# Patient Record
Sex: MALE | Race: WHITE | HISPANIC OR LATINO | URBAN - METROPOLITAN AREA
[De-identification: names, ages, dates, MRNs, and addresses within clinical notes are randomized per-mention and may not be internally consistent; named-entity substitution may affect disease eponyms.]

---

## 2024-11-15 ENCOUNTER — TELEPHONE (OUTPATIENT)
Dept: UROLOGY | Facility: CLINIC | Age: 34
End: 2024-11-15
Payer: COMMERCIAL

## 2024-11-15 NOTE — TELEPHONE ENCOUNTER
Spoke with pt and informed him that Dr. Schilling was no longer here at [facility]. Pt stated that he has been having some irration and pressure after urinating. Pt went to urgent care about 3 weeks and was given cipro abx and was told to follow up with urologist. Pt was scheduled with NP. Pt confirmed appt

## 2024-11-15 NOTE — TELEPHONE ENCOUNTER
----- Message from Patricia sent at 11/15/2024  4:28 PM CST -----  Type:  Needs Medical Advice/new patient     Who Called: pt    Would the patient rather a call back or a response via [facility]? Call     Best Call Back Number: [phone]    Additional Information: new patient appointment.

## 2024-11-18 ENCOUNTER — TELEPHONE (OUTPATIENT)
Dept: UROLOGY | Facility: CLINIC | Age: 34
End: 2024-11-18

## 2024-11-18 ENCOUNTER — LAB VISIT (OUTPATIENT)
Dept: LAB | Facility: HOSPITAL | Age: 34
End: 2024-11-18
Payer: COMMERCIAL

## 2024-11-18 ENCOUNTER — OFFICE VISIT (OUTPATIENT)
Dept: UROLOGY | Facility: CLINIC | Age: 34
End: 2024-11-18
Payer: COMMERCIAL

## 2024-11-18 VITALS
WEIGHT: 156.5 LBS | BODY MASS INDEX: 25.15 KG/M2 | SYSTOLIC BLOOD PRESSURE: 129 MMHG | HEIGHT: 66 IN | HEART RATE: 90 BPM | DIASTOLIC BLOOD PRESSURE: 91 MMHG

## 2024-11-18 DIAGNOSIS — R35.0 URINARY FREQUENCY: ICD-10-CM

## 2024-11-18 DIAGNOSIS — R10.2 SUPRAPUBIC PAIN: ICD-10-CM

## 2024-11-18 DIAGNOSIS — R39.15 URINARY URGENCY: ICD-10-CM

## 2024-11-18 DIAGNOSIS — R39.15 URINARY URGENCY: Primary | ICD-10-CM

## 2024-11-18 DIAGNOSIS — N53.12 PAIN WITH EJACULATION: ICD-10-CM

## 2024-11-18 DIAGNOSIS — R39.13 SPLIT OF URINARY STREAM: ICD-10-CM

## 2024-11-18 LAB
BILIRUB SERPL-MCNC: ABNORMAL MG/DL
BLOOD URINE, POC: ABNORMAL
CLARITY, POC UA: CLEAR
COLOR, POC UA: YELLOW
GLUCOSE UR QL STRIP: ABNORMAL
KETONES UR QL STRIP: ABNORMAL
LEUKOCYTE ESTERASE URINE, POC: ABNORMAL
NITRITE, POC UA: ABNORMAL
PH, POC UA: 6
POC RESIDUAL URINE VOLUME: 1 ML (ref 0–100)
PROTEIN, POC: ABNORMAL
SPECIFIC GRAVITY, POC UA: 1.01
UROBILINOGEN, POC UA: ABNORMAL

## 2024-11-18 PROCEDURE — 3008F BODY MASS INDEX DOCD: CPT | Mod: CPTII,S$GLB,,

## 2024-11-18 PROCEDURE — 87186 SC STD MICRODIL/AGAR DIL: CPT

## 2024-11-18 PROCEDURE — 87086 URINE CULTURE/COLONY COUNT: CPT

## 2024-11-18 PROCEDURE — 81002 URINALYSIS NONAUTO W/O SCOPE: CPT | Mod: S$GLB,,,

## 2024-11-18 PROCEDURE — 99204 OFFICE O/P NEW MOD 45 MIN: CPT | Mod: S$GLB,,,

## 2024-11-18 PROCEDURE — 3080F DIAST BP >= 90 MM HG: CPT | Mod: CPTII,S$GLB,,

## 2024-11-18 PROCEDURE — 1159F MED LIST DOCD IN RCRD: CPT | Mod: CPTII,S$GLB,,

## 2024-11-18 PROCEDURE — 1160F RVW MEDS BY RX/DR IN RCRD: CPT | Mod: CPTII,S$GLB,,

## 2024-11-18 PROCEDURE — 87798 DETECT AGENT NOS DNA AMP: CPT

## 2024-11-18 PROCEDURE — 99999 PR PBB SHADOW E&M-EST. PATIENT-LVL III: CPT | Mod: PBBFAC,,,

## 2024-11-18 PROCEDURE — 51798 US URINE CAPACITY MEASURE: CPT | Mod: S$GLB,,,

## 2024-11-18 PROCEDURE — 87563 M. GENITALIUM AMP PROBE: CPT

## 2024-11-18 PROCEDURE — 87088 URINE BACTERIA CULTURE: CPT

## 2024-11-18 PROCEDURE — 3074F SYST BP LT 130 MM HG: CPT | Mod: CPTII,S$GLB,,

## 2024-11-18 RX ORDER — DOXYCYCLINE 100 MG/1
100 CAPSULE ORAL 2 TIMES DAILY
Qty: 14 CAPSULE | Refills: 0 | Status: SHIPPED | OUTPATIENT
Start: 2024-11-18 | End: 2024-11-25

## 2024-11-18 NOTE — PROGRESS NOTES
"Subjective:       Patient ID: Gokul Ryan is a 34 y.o. male.    Chief Complaint: urinary issues     This is a 34 y.o.  male patient that is new to me.  The patient was referred to me by Dr. Henriquez for urinary issues. Patient went to urgent care on 10/17/24 w/ c/o urinary frequency, urgency, and dysuria. Patient was given 2 weeks of ciprofloxacin. STD screening with urgent care negative per patient.   Today patient reports the ciprofloxacin helped with symptoms but symptoms come back intermittently. Reports urinary urgency, frequency, suprapubic discomfort, burning in urethra but no burning while voiding. Reports pinching pain during ejaculation.  Reports split steam for years. Sometimes has weak stream. Denies straining to urinate. Reports that he has been refraining from caffeine and increasing water intake. Reports that he did take ibuprofen a couple of times and this did help.  PVR 1ml immediately after urinating in clinic  Urine dipstick- positive for Leukocytes, negative for RBC, and Nitrites     LAST PSA  No results found for: "PSA", "PSADIAG", "PSATOTAL", "PSAFREE"    No results found for: "CREATININE"    ---  PMH/PSH/Medications/Allergies/Social history reviewed and as in chart.    Review of Systems   Constitutional:  Negative for activity change, chills and fever.   Respiratory:  Negative for shortness of breath.    Cardiovascular:  Negative for chest pain and palpitations.   Gastrointestinal:  Negative for abdominal pain and constipation.   Genitourinary:  Positive for difficulty urinating, frequency and urgency. Negative for dysuria, flank pain and hematuria.   Neurological:  Negative for dizziness and light-headedness.       Objective:      Physical Exam  HENT:      Head: Normocephalic.   Pulmonary:      Effort: Pulmonary effort is normal.   Genitourinary:     Prostate: Not enlarged, not tender and no nodules present.      Comments: ENRIQUE-- prostate firm, smooth without nodules or lesions, not " enlarged  Musculoskeletal:         General: Normal range of motion.      Cervical back: Normal range of motion.   Skin:     General: Skin is warm and dry.   Neurological:      Mental Status: He is alert and oriented to person, place, and time.         Assessment:     Problem Noted   Urinary Frequency 11/18/2024   Urinary Urgency 11/18/2024   Suprapubic Pain 11/18/2024   Pain With Ejaculation 11/18/2024   Split of Urinary Stream 11/18/2024       Plan:     Rx sent for doxycycline BID x7 days.  Advised to drink 2L of water per day, refrain from bladder irritants, take ibuprofen 400mg TID x2 weeks.  Discussed future cystoscopy if split stream continues, patient voiced understanding  ENRIQUE normal, see physical assessment  Urine sent for urine culture, ureaplasma, and mycoplasma  Follow-up 2 weeks with virtual visit    JERRY Goode    I spent a total of 30 minutes on the day of the visit.This includes face to face time and non-face to face time preparing to see the patient (eg, review of tests), obtaining and/or reviewing separately obtained history, documenting clinical information in the electronic or other health record, independently interpreting results and communicating results to the patient/family/caregiver, or care coordinator.

## 2024-11-18 NOTE — TELEPHONE ENCOUNTER
----- Message from Patricia sent at 11/18/2024  1:22 PM CST -----  Type:  Needs Medical Advice/medication     Who Called: pt    Would the patient rather a call back or a response via [facility]? Call     Best Call Back Number: [phone]    Additional Information: pt requesting a call back to discuss new medication instruction doxycycline (MONODOX) 100 MG capsule

## 2024-11-18 NOTE — TELEPHONE ENCOUNTER
Patient had an question about the medication, I informed him its for the 7 days and his virtual visit is in 2 weeks. Patient voice his understanding.

## 2024-11-18 NOTE — PATIENT INSTRUCTIONS
Drink 2 liters of water per day  Take ibuprofen 400mg three times per day for 2 weeks  Take doxycycline 100mg 2 times daily for 2 weeks

## 2024-11-19 LAB
M GENITALIUM DNA UR QL NAA+PROBE: NEGATIVE
SPECIMEN SOURCE: NORMAL

## 2024-11-20 LAB — BACTERIA UR CULT: ABNORMAL

## 2024-11-21 LAB
SPECIMEN SOURCE: NORMAL
U PARVUM DNA SPEC QL NAA+PROBE: NEGATIVE
U UREALYTICUM DNA SPEC QL NAA+PROBE: NEGATIVE

## 2024-11-22 ENCOUNTER — TELEPHONE (OUTPATIENT)
Dept: UROLOGY | Facility: CLINIC | Age: 34
End: 2024-11-22
Payer: COMMERCIAL

## 2024-11-22 ENCOUNTER — PATIENT MESSAGE (OUTPATIENT)
Dept: UROLOGY | Facility: CLINIC | Age: 34
End: 2024-11-22
Payer: COMMERCIAL

## 2024-11-22 RX ORDER — CIPROFLOXACIN 500 MG/1
500 TABLET ORAL 2 TIMES DAILY
Qty: 28 TABLET | Refills: 0 | Status: SHIPPED | OUTPATIENT
Start: 2024-11-22 | End: 2024-12-06

## 2024-11-22 NOTE — TELEPHONE ENCOUNTER
----- Message from JERRY Goode sent at 11/22/2024  8:51 AM CST -----  I am sending him 2 weeks of ciprofloxacin. He was scheduled for a f/u on 12/2 but can we reschedule until after he has finished the antibiotics and repeat urine at f/u.  Thanks,  Alicia

## 2024-12-04 RX ORDER — CIPROFLOXACIN 500 MG/1
500 TABLET ORAL 2 TIMES DAILY
Qty: 28 TABLET | Refills: 0 | Status: SHIPPED | OUTPATIENT
Start: 2024-12-04 | End: 2024-12-18

## 2024-12-09 ENCOUNTER — OFFICE VISIT (OUTPATIENT)
Dept: UROLOGY | Facility: CLINIC | Age: 34
End: 2024-12-09
Payer: COMMERCIAL

## 2024-12-09 VITALS
DIASTOLIC BLOOD PRESSURE: 83 MMHG | WEIGHT: 150.88 LBS | HEART RATE: 120 BPM | HEIGHT: 66 IN | SYSTOLIC BLOOD PRESSURE: 128 MMHG | BODY MASS INDEX: 24.25 KG/M2

## 2024-12-09 DIAGNOSIS — N30.00 ACUTE CYSTITIS WITHOUT HEMATURIA: ICD-10-CM

## 2024-12-09 DIAGNOSIS — R39.15 URINARY URGENCY: Primary | ICD-10-CM

## 2024-12-09 DIAGNOSIS — R39.13 SPLIT OF URINARY STREAM: ICD-10-CM

## 2024-12-09 DIAGNOSIS — N35.919 STRICTURE OF MALE URETHRA, UNSPECIFIED STRICTURE TYPE: ICD-10-CM

## 2024-12-09 LAB
BACTERIA #/AREA URNS AUTO: NORMAL /HPF
BILIRUB SERPL-MCNC: ABNORMAL MG/DL
BLOOD URINE, POC: ABNORMAL
CLARITY, POC UA: CLEAR
COLOR, POC UA: YELLOW
GLUCOSE UR QL STRIP: ABNORMAL
KETONES UR QL STRIP: 40
LEUKOCYTE ESTERASE URINE, POC: ABNORMAL
MICROSCOPIC COMMENT: NORMAL
NITRITE, POC UA: ABNORMAL
PH, POC UA: 6
PROTEIN, POC: ABNORMAL
SPECIFIC GRAVITY, POC UA: 1.01
UROBILINOGEN, POC UA: ABNORMAL
WBC #/AREA URNS AUTO: 0 /HPF (ref 0–5)

## 2024-12-09 PROCEDURE — 1159F MED LIST DOCD IN RCRD: CPT | Mod: CPTII,S$GLB,,

## 2024-12-09 PROCEDURE — 87086 URINE CULTURE/COLONY COUNT: CPT

## 2024-12-09 PROCEDURE — 81001 URINALYSIS AUTO W/SCOPE: CPT

## 2024-12-09 PROCEDURE — 99999 PR PBB SHADOW E&M-EST. PATIENT-LVL III: CPT | Mod: PBBFAC,,,

## 2024-12-09 PROCEDURE — 3008F BODY MASS INDEX DOCD: CPT | Mod: CPTII,S$GLB,,

## 2024-12-09 PROCEDURE — 99215 OFFICE O/P EST HI 40 MIN: CPT | Mod: S$GLB,,,

## 2024-12-09 PROCEDURE — 81002 URINALYSIS NONAUTO W/O SCOPE: CPT | Mod: S$GLB,,,

## 2024-12-09 PROCEDURE — 3079F DIAST BP 80-89 MM HG: CPT | Mod: CPTII,S$GLB,,

## 2024-12-09 PROCEDURE — 1160F RVW MEDS BY RX/DR IN RCRD: CPT | Mod: CPTII,S$GLB,,

## 2024-12-09 PROCEDURE — 3074F SYST BP LT 130 MM HG: CPT | Mod: CPTII,S$GLB,,

## 2024-12-09 NOTE — Clinical Note
Hey I have seen this patient in clinic with LUTS consisting of frequency, urgency, weak stream, split stream, painful ejaculation once, increased frequency after ejaculation. Urine culture came back positive for UTI, e. Coli <50,000. Currently finishing 4 weeks of ciprofloxacin, still having some symptoms. I talked to him about getting a work-up done for possible urethral stricture. He is very hesitant and wants the cystoscopy done in the OR asleep. He agreed to the CTU. Wanted to reach out to you to see if the cysto in the OR is an option? Do you want to wait until I get the CTU results? Thanks, Alicia

## 2024-12-09 NOTE — PROGRESS NOTES
"Subjective:       Patient ID: Gokul Ryan is a 34 y.o. male.    Chief Complaint: urinary issues     This is a 34 y.o.  male patient that is an established patient of mine. The patient was referred to me by Dr. Henriquez for urinary issues.   10/17/24 went to urgent care w/ c/o urinary frequency, urgency, and dysuria. Patient was given 2 weeks of ciprofloxacin. STD screening with urgent care negative per patient.   11/18/24-- Today patient reports the ciprofloxacin helped with symptoms but symptoms came back intermittently. Reports urinary urgency, frequency, suprapubic discomfort, burning in urethra but no burning while voiding. Reports pinching pain during ejaculation that only happened one time.  Reports split steam for years. Sometimes has weak stream. Denies straining to urinate. Reports that he has been refraining from caffeine and increasing water intake. Reports that he did take ibuprofen a couple of times and this did help.  ENRIQUE normal, not enlarged, no lesions or nodules palpated  PVR 1ml immediately after urinating in clinic  Urine culture positive for E. Coli <50,000, had started 7 day course of doxycycline, switched to two weeks of ciprofloxacin after culture results.  11/22/24-- Patient messaged with urinary frequency and urgency after pre-ejaculate as well as suprapubic discomfort. Rx for ciprofloxacin, total course of ciprofloxacin to equal 4 weeks.  12/9/24-- Here for f/u. Reports that his symptoms overall are getting better. Reports less frequency and urgency. Reports that he has refrained from sexual intercourse. Still has split stream.    LAST PSA  No results found for: "PSA", "PSADIAG", "PSATOTAL", "PSAFREE"    No results found for: "CREATININE"    ---  PMH/PSH/Medications/Allergies/Social history reviewed and as in chart.    Review of Systems   Constitutional:  Negative for activity change, chills and fever.   Respiratory:  Negative for shortness of breath.    Cardiovascular:  Negative for " chest pain and palpitations.   Gastrointestinal:  Negative for abdominal pain and constipation.   Genitourinary:  Positive for difficulty urinating, frequency and urgency. Negative for dysuria, flank pain and hematuria.   Neurological:  Negative for dizziness and light-headedness.       Objective:      Physical Exam  HENT:      Head: Normocephalic.   Pulmonary:      Effort: Pulmonary effort is normal.   Musculoskeletal:         General: Normal range of motion.      Cervical back: Normal range of motion.   Skin:     General: Skin is warm and dry.   Neurological:      Mental Status: He is alert and oriented to person, place, and time.         Assessment:     Problem Noted   Acute Cystitis Without Hematuria 12/9/2024   Urinary Frequency 11/18/2024   Urinary Urgency 11/18/2024   Suprapubic Pain 11/18/2024   Pain With Ejaculation 11/18/2024   Split of Urinary Stream 11/18/2024       Plan:     Finish the ciprofloxacin, end date on 12/18/24  Drink 2L of water per day. Avoid bladder irritants including but not limited to caffeine, alcohol, smoking, spicy foods, acidic foods, tomato-based products, citrus, artificial sweeteners, chocolate, coffee or tea.    Discussed in depth that based on symptoms, age, and UTI a work-up is the next step and is recommended. Patient hesitant about cystoscopy while awake. Advised patient that there is a concern for possible urethral stricture and that a cystoscopy would be needed to properly diagnose. Agrees to proceed with CTU.   Schedule CTU with cmp prior  Urine sent for urine culture and UA micro  Follow-up pending urine culture and CTU results    JERRY Goode spent a total of 40 minutes on the day of the visit.This includes face to face time and non-face to face time preparing to see the patient (eg, review of tests), obtaining and/or reviewing separately obtained history, documenting clinical information in the electronic or other health record, independently interpreting  results and communicating results to the patient/family/caregiver, or care coordinator.

## 2024-12-11 ENCOUNTER — PATIENT MESSAGE (OUTPATIENT)
Dept: UROLOGY | Facility: CLINIC | Age: 34
End: 2024-12-11
Payer: COMMERCIAL

## 2024-12-11 LAB — BACTERIA UR CULT: NO GROWTH

## 2024-12-30 ENCOUNTER — LAB VISIT (OUTPATIENT)
Dept: LAB | Facility: HOSPITAL | Age: 34
End: 2024-12-30
Payer: COMMERCIAL

## 2024-12-30 DIAGNOSIS — R39.13 SPLIT OF URINARY STREAM: ICD-10-CM

## 2024-12-30 DIAGNOSIS — N35.919 STRICTURE OF MALE URETHRA, UNSPECIFIED STRICTURE TYPE: ICD-10-CM

## 2024-12-30 LAB
ALBUMIN SERPL BCP-MCNC: 4.4 G/DL (ref 3.5–5.2)
ALP SERPL-CCNC: 39 U/L (ref 40–150)
ALT SERPL W/O P-5'-P-CCNC: 27 U/L (ref 10–44)
ANION GAP SERPL CALC-SCNC: 11 MMOL/L (ref 8–16)
AST SERPL-CCNC: 25 U/L (ref 10–40)
BILIRUB SERPL-MCNC: 0.6 MG/DL (ref 0.1–1)
BUN SERPL-MCNC: 9 MG/DL (ref 6–20)
CALCIUM SERPL-MCNC: 9.2 MG/DL (ref 8.7–10.5)
CHLORIDE SERPL-SCNC: 104 MMOL/L (ref 95–110)
CO2 SERPL-SCNC: 23 MMOL/L (ref 23–29)
CREAT SERPL-MCNC: 1 MG/DL (ref 0.5–1.4)
EST. GFR  (NO RACE VARIABLE): >60 ML/MIN/1.73 M^2
GLUCOSE SERPL-MCNC: 103 MG/DL (ref 70–110)
POTASSIUM SERPL-SCNC: 4.1 MMOL/L (ref 3.5–5.1)
PROT SERPL-MCNC: 7.5 G/DL (ref 6–8.4)
SODIUM SERPL-SCNC: 138 MMOL/L (ref 136–145)

## 2024-12-30 PROCEDURE — 80053 COMPREHEN METABOLIC PANEL: CPT

## 2024-12-30 PROCEDURE — 36415 COLL VENOUS BLD VENIPUNCTURE: CPT

## 2025-01-03 ENCOUNTER — HOSPITAL ENCOUNTER (OUTPATIENT)
Dept: RADIOLOGY | Facility: HOSPITAL | Age: 35
Discharge: HOME | End: 2025-01-03
Payer: COMMERCIAL

## 2025-01-03 ENCOUNTER — PATIENT MESSAGE (OUTPATIENT)
Dept: UROLOGY | Facility: CLINIC | Age: 35
End: 2025-01-03
Payer: COMMERCIAL

## 2025-01-03 DIAGNOSIS — N35.919 STRICTURE OF MALE URETHRA, UNSPECIFIED STRICTURE TYPE: ICD-10-CM

## 2025-01-03 PROCEDURE — 74178 CT ABD&PLV WO CNTR FLWD CNTR: CPT | Mod: 26,,, | Performed by: INTERNAL MEDICINE

## 2025-01-03 PROCEDURE — 74178 CT ABD&PLV WO CNTR FLWD CNTR: CPT | Mod: TC

## 2025-01-03 PROCEDURE — 25500020 PHARM REV CODE 255

## 2025-01-03 RX ADMIN — IOHEXOL 150 ML: 350 INJECTION, SOLUTION INTRAVENOUS at 09:01

## 2025-01-06 ENCOUNTER — OFFICE VISIT (OUTPATIENT)
Dept: UROLOGY | Facility: CLINIC | Age: 35
End: 2025-01-06
Payer: COMMERCIAL

## 2025-01-06 VITALS
WEIGHT: 150.44 LBS | HEIGHT: 66 IN | HEART RATE: 91 BPM | DIASTOLIC BLOOD PRESSURE: 84 MMHG | SYSTOLIC BLOOD PRESSURE: 114 MMHG | BODY MASS INDEX: 24.18 KG/M2

## 2025-01-06 DIAGNOSIS — R39.15 URINARY URGENCY: Primary | ICD-10-CM

## 2025-01-06 DIAGNOSIS — N53.12 PAIN WITH EJACULATION: ICD-10-CM

## 2025-01-06 DIAGNOSIS — R35.0 URINARY FREQUENCY: ICD-10-CM

## 2025-01-06 DIAGNOSIS — R10.2 SUPRAPUBIC PAIN: ICD-10-CM

## 2025-01-06 DIAGNOSIS — R39.13 SPLIT OF URINARY STREAM: ICD-10-CM

## 2025-01-06 PROCEDURE — 1159F MED LIST DOCD IN RCRD: CPT | Mod: CPTII,S$GLB,, | Performed by: UROLOGY

## 2025-01-06 PROCEDURE — 99214 OFFICE O/P EST MOD 30 MIN: CPT | Mod: S$GLB,,, | Performed by: UROLOGY

## 2025-01-06 PROCEDURE — 3008F BODY MASS INDEX DOCD: CPT | Mod: CPTII,S$GLB,, | Performed by: UROLOGY

## 2025-01-06 PROCEDURE — 3074F SYST BP LT 130 MM HG: CPT | Mod: CPTII,S$GLB,, | Performed by: UROLOGY

## 2025-01-06 PROCEDURE — 3079F DIAST BP 80-89 MM HG: CPT | Mod: CPTII,S$GLB,, | Performed by: UROLOGY

## 2025-01-06 PROCEDURE — 1160F RVW MEDS BY RX/DR IN RCRD: CPT | Mod: CPTII,S$GLB,, | Performed by: UROLOGY

## 2025-01-06 PROCEDURE — 99999 PR PBB SHADOW E&M-EST. PATIENT-LVL III: CPT | Mod: PBBFAC,,, | Performed by: UROLOGY

## 2025-01-06 NOTE — PROGRESS NOTES
"Subjective:       Patient ID: Gokul Ryan is a 34 y.o. male.    Chief Complaint: urinary issues     This is a 34 y.o.  male patient that is an established patient of mine. The patient was referred to me by Dr. Henriquez for urinary issues.   10/17/24 went to urgent care w/ c/o urinary frequency, urgency, and dysuria. Patient was given 2 weeks of ciprofloxacin. STD screening with urgent care negative per patient.   11/18/24-- Today patient reports the ciprofloxacin helped with symptoms but symptoms came back intermittently. Reports urinary urgency, frequency, suprapubic discomfort, burning in urethra but no burning while voiding. Reports pinching pain during ejaculation that only happened one time.  Reports split steam for years. Sometimes has weak stream. Denies straining to urinate. Reports that he has been refraining from caffeine and increasing water intake. Reports that he did take ibuprofen a couple of times and this did help.  ENRIQUE normal, not enlarged, no lesions or nodules palpated  PVR 1ml immediately after urinating in clinic  Urine culture positive for E. Coli <50,000, had started 7 day course of doxycycline, switched to two weeks of ciprofloxacin after culture results.  11/22/24-- Patient messaged with urinary frequency and urgency after pre-ejaculate as well as suprapubic discomfort. Rx for ciprofloxacin, total course of ciprofloxacin to equal 4 weeks.  12/9/24-- Here for f/u. Reports that his symptoms overall are getting better. Reports less frequency and urgency. Reports that he has refrained from sexual intercourse. Still has split stream.    Interval 1/6/25:  - Reports that all urinary symptoms have resolved since finishing 6 week course of cipro. Denies gross hematuria.    LAST PSA  No results found for: "PSA", "PSADIAG", "PSATOTAL", "PSAFREE"    Lab Results   Component Value Date    CREATININE 1.0 12/30/2024       ---  PMH/PSH/Medications/Allergies/Social history reviewed and as in " chart.    Review of Systems   Constitutional:  Negative for activity change, chills and fever.   Respiratory:  Negative for shortness of breath.    Cardiovascular:  Negative for chest pain and palpitations.   Gastrointestinal:  Negative for abdominal pain and constipation.   Genitourinary:  Positive for difficulty urinating, frequency and urgency. Negative for dysuria, flank pain and hematuria.   Neurological:  Negative for dizziness and light-headedness.       Objective:      Physical Exam  HENT:      Head: Normocephalic.   Pulmonary:      Effort: Pulmonary effort is normal.   Musculoskeletal:         General: Normal range of motion.      Cervical back: Normal range of motion.   Skin:     General: Skin is warm and dry.   Neurological:      Mental Status: He is alert and oriented to person, place, and time.     Normal  exam: circumcised penis, orthotopic meatus, no rashes  Bilateral testes descended, normal size, no masses, nontender      Assessment:     No problems updated.      Plan:     Reports complete resolution of urinary symptoms since course of ciprofloxacin  Drink 3L of water per day. Avoid bladder irritants including but not limited to caffeine, alcohol, smoking, spicy foods, acidic foods, tomato-based products, citrus, artificial sweeteners, chocolate, coffee or tea.    Discussed in depth that based on symptoms, age, and UTI a work-up is the next step and is recommended. Patient hesitant about cystoscopy while awake. Advised patient that there is a concern for possible urethral stricture and that a cystoscopy would be needed to properly diagnose. Agrees to proceed with CTU.   No abnormal findings on CTU  If symptoms return, would recommend proceeding with cystoscopy    Levy Stevenson MD    I spent a total of 30 minutes on the day of the visit.This includes face to face time and non-face to face time preparing to see the patient (eg, review of tests), obtaining and/or reviewing separately obtained  history, documenting clinical information in the electronic or other health record, independently interpreting results and communicating results to the patient/family/caregiver, or care coordinator.